# Patient Record
Sex: FEMALE | URBAN - METROPOLITAN AREA
[De-identification: names, ages, dates, MRNs, and addresses within clinical notes are randomized per-mention and may not be internally consistent; named-entity substitution may affect disease eponyms.]

---

## 2019-09-30 ENCOUNTER — APPOINTMENT (OUTPATIENT)
Age: 44
Setting detail: DERMATOLOGY
End: 2019-09-30

## 2019-09-30 DIAGNOSIS — L98.8 OTHER SPECIFIED DISORDERS OF THE SKIN AND SUBCUTANEOUS TISSUE: ICD-10-CM

## 2019-09-30 PROCEDURE — OTHER ADDITIONAL NOTES: OTHER

## 2019-09-30 NOTE — PROCEDURE: ADDITIONAL NOTES
Additional Notes: [employer] units, lot number [id number], exp 2022. Additional Notes: [employer] units, lot number [id number], exp 2022.

## 2020-01-13 ENCOUNTER — APPOINTMENT (OUTPATIENT)
Age: 45
Setting detail: DERMATOLOGY
End: 2020-01-13

## 2020-01-13 DIAGNOSIS — L98.8 OTHER SPECIFIED DISORDERS OF THE SKIN AND SUBCUTANEOUS TISSUE: ICD-10-CM

## 2020-01-13 PROCEDURE — OTHER BOTOX: OTHER

## 2020-06-03 ENCOUNTER — APPOINTMENT (OUTPATIENT)
Age: 45
Setting detail: DERMATOLOGY
End: 2020-06-12

## 2020-06-03 VITALS — TEMPERATURE: 98.8 F

## 2020-06-03 DIAGNOSIS — L98.8 OTHER SPECIFIED DISORDERS OF THE SKIN AND SUBCUTANEOUS TISSUE: ICD-10-CM

## 2020-06-03 PROCEDURE — OTHER BOTOX: OTHER

## 2020-10-02 ENCOUNTER — APPOINTMENT (OUTPATIENT)
Age: 45
Setting detail: DERMATOLOGY
End: 2020-10-05

## 2020-10-02 VITALS — TEMPERATURE: 98.6 F

## 2020-10-02 DIAGNOSIS — L98.8 OTHER SPECIFIED DISORDERS OF THE SKIN AND SUBCUTANEOUS TISSUE: ICD-10-CM

## 2020-10-02 PROCEDURE — OTHER BOTOX: OTHER
